# Patient Record
Sex: FEMALE | Race: WHITE | Employment: OTHER | URBAN - NONMETROPOLITAN AREA
[De-identification: names, ages, dates, MRNs, and addresses within clinical notes are randomized per-mention and may not be internally consistent; named-entity substitution may affect disease eponyms.]

---

## 2023-11-20 ENCOUNTER — TELEPHONE (OUTPATIENT)
Dept: OBGYN CLINIC | Age: 88
End: 2023-11-20

## 2023-11-20 NOTE — TELEPHONE ENCOUNTER
This is the referral I asked you about, I scheduled her with [facility]. She has already had an ultrasound this week, I went ahead and scheduled her, her daughter said they may cancel because the ultrasound was said to be normal but she is waiting to hear from PCP before deciding.